# Patient Record
Sex: MALE | Race: WHITE | URBAN - METROPOLITAN AREA
[De-identification: names, ages, dates, MRNs, and addresses within clinical notes are randomized per-mention and may not be internally consistent; named-entity substitution may affect disease eponyms.]

---

## 2020-12-21 ENCOUNTER — APPOINTMENT (OUTPATIENT)
Dept: URBAN - METROPOLITAN AREA SURGERY 8 | Age: 60
Setting detail: DERMATOLOGY
End: 2020-12-21

## 2020-12-21 DIAGNOSIS — L91.8 OTHER HYPERTROPHIC DISORDERS OF THE SKIN: ICD-10-CM

## 2020-12-21 DIAGNOSIS — L82.0 INFLAMED SEBORRHEIC KERATOSIS: ICD-10-CM

## 2020-12-21 DIAGNOSIS — L30.1 DYSHIDROSIS [POMPHOLYX]: ICD-10-CM

## 2020-12-21 DIAGNOSIS — D485 NEOPLASM OF UNCERTAIN BEHAVIOR OF SKIN: ICD-10-CM

## 2020-12-21 PROBLEM — D48.5 NEOPLASM OF UNCERTAIN BEHAVIOR OF SKIN: Status: ACTIVE | Noted: 2020-12-21

## 2020-12-21 PROCEDURE — OTHER LIQUID NITROGEN: OTHER

## 2020-12-21 PROCEDURE — 17110 DESTRUCT B9 LESION 1-14: CPT

## 2020-12-21 PROCEDURE — OTHER PRESCRIPTION: OTHER

## 2020-12-21 PROCEDURE — 11102 TANGNTL BX SKIN SINGLE LES: CPT | Mod: 59

## 2020-12-21 PROCEDURE — OTHER BIOPSY BY SHAVE METHOD: OTHER

## 2020-12-21 PROCEDURE — OTHER MEDICATION COUNSELING: OTHER

## 2020-12-21 PROCEDURE — OTHER DEFER: OTHER

## 2020-12-21 PROCEDURE — OTHER COUNSELING: OTHER

## 2020-12-21 PROCEDURE — OTHER MIPS QUALITY: OTHER

## 2020-12-21 PROCEDURE — 99203 OFFICE O/P NEW LOW 30 MIN: CPT | Mod: 25

## 2020-12-21 RX ORDER — CLOBETASOL PROPIONATE 0.5 MG/G
CREAM TOPICAL BID
Qty: 1 | Refills: 2 | Status: ERX | COMMUNITY
Start: 2020-12-21

## 2020-12-21 ASSESSMENT — LOCATION ZONE DERM
LOCATION ZONE: GENITALIA
LOCATION ZONE: TRUNK
LOCATION ZONE: HAND
LOCATION ZONE: AXILLAE
LOCATION ZONE: NECK

## 2020-12-21 ASSESSMENT — LOCATION DETAILED DESCRIPTION DERM
LOCATION DETAILED: LEFT SCROTUM
LOCATION DETAILED: RIGHT AXILLARY VAULT
LOCATION DETAILED: LEFT THENAR EMINENCE
LOCATION DETAILED: LEFT AXILLARY VAULT
LOCATION DETAILED: RIGHT CLAVICULAR NECK
LOCATION DETAILED: INFERIOR THORACIC SPINE

## 2020-12-21 ASSESSMENT — LOCATION SIMPLE DESCRIPTION DERM
LOCATION SIMPLE: LEFT AXILLARY VAULT
LOCATION SIMPLE: UPPER BACK
LOCATION SIMPLE: RIGHT AXILLARY VAULT
LOCATION SIMPLE: SCROTUM
LOCATION SIMPLE: LEFT HAND
LOCATION SIMPLE: RIGHT ANTERIOR NECK

## 2020-12-21 NOTE — PROCEDURE: MEDICATION COUNSELING
No indicators present Quinolones Counseling:  I discussed with the patient the risks of fluoroquinolones including but not limited to GI upset, allergic reaction, drug rash, diarrhea, dizziness, photosensitivity, yeast infections, liver function test abnormalities, tendonitis/tendon rupture.

## 2020-12-21 NOTE — PROCEDURE: DEFER
Procedure To Be Performed At Next Visit: Excision (Cosmetic)
Introduction Text (Please End With A Colon): The following procedure was deferred:
Detail Level: Simple
Instructions (Optional): will defer given clinical suspicion for malignancy on left scrotum

## 2020-12-21 NOTE — PROCEDURE: MEDICATION COUNSELING
Xeladonayz Pregnancy And Lactation Text: This medication is Pregnancy Category D and is not considered safe during pregnancy.  The risk during breast feeding is also uncertain.

## 2020-12-21 NOTE — PROCEDURE: LIQUID NITROGEN
Detail Level: Detailed
Render Post-Care Instructions In Note?: no
Medical Necessity Information: It is in your best interest to select a reason for this procedure from the list below. All of these items fulfill various CMS LCD requirements except the new and changing color options.
Consent: The patient's consent was obtained including but not limited to risks of crusting, scabbing, blistering, scarring, darker or lighter pigmentary change, recurrence, incomplete removal and infection.
Medical Necessity Clause: This procedure was medically necessary because the lesions that were treated were:
Number Of Freeze-Thaw Cycles: 2 freeze-thaw cycles
Duration Of Freeze Thaw-Cycle (Seconds): 5-10
Post-Care Instructions: I reviewed with the patient in detail post-care instructions. Patient is to wear sunprotection, and avoid picking at any of the treated lesions. Pt may apply Vaseline to crusted or scabbing areas.

## 2021-02-05 ENCOUNTER — APPOINTMENT (OUTPATIENT)
Dept: URBAN - METROPOLITAN AREA SURGERY 9 | Age: 61
Setting detail: DERMATOLOGY
End: 2021-02-15

## 2021-02-05 VITALS — SYSTOLIC BLOOD PRESSURE: 160 MMHG | DIASTOLIC BLOOD PRESSURE: 90 MMHG | HEART RATE: 64 BPM | RESPIRATION RATE: 16 BRPM

## 2021-02-05 DIAGNOSIS — D485 NEOPLASM OF UNCERTAIN BEHAVIOR OF SKIN: ICD-10-CM

## 2021-02-05 PROBLEM — C44.519 BASAL CELL CARCINOMA OF SKIN OF OTHER PART OF TRUNK: Status: ACTIVE | Noted: 2021-02-05

## 2021-02-05 PROBLEM — D48.5 NEOPLASM OF UNCERTAIN BEHAVIOR OF SKIN: Status: ACTIVE | Noted: 2021-02-05

## 2021-02-05 PROCEDURE — OTHER RETURN TO REFERRING PROVIDER: OTHER

## 2021-02-05 PROCEDURE — 17312 MOHS ADDL STAGE: CPT

## 2021-02-05 PROCEDURE — 54100 BIOPSY OF PENIS: CPT

## 2021-02-05 PROCEDURE — 12042 INTMD RPR N-HF/GENIT2.6-7.5: CPT | Mod: 59

## 2021-02-05 PROCEDURE — 17311 MOHS 1 STAGE H/N/HF/G: CPT

## 2021-02-05 PROCEDURE — OTHER PRESCRIPTION: OTHER

## 2021-02-05 PROCEDURE — OTHER CONSULTATION FOR MOHS SURGERY: OTHER

## 2021-02-05 PROCEDURE — OTHER MOHS SURGERY: OTHER

## 2021-02-05 PROCEDURE — OTHER BIOPSY BY SHAVE METHOD: OTHER

## 2021-02-05 RX ORDER — MUPIROCIN 20 MG/G
OINTMENT TOPICAL BID
Qty: 1 | Refills: 3 | Status: ERX | COMMUNITY
Start: 2021-02-05

## 2021-02-05 RX ORDER — DOXYCYCLINE HYCLATE 100 MG/1
TABLET, COATED ORAL BID
Qty: 28 | Refills: 1 | Status: ERX | COMMUNITY
Start: 2021-02-05

## 2021-02-05 ASSESSMENT — LOCATION ZONE DERM
LOCATION ZONE: PENIS
LOCATION ZONE: PENIS

## 2021-02-05 ASSESSMENT — LOCATION DETAILED DESCRIPTION DERM
LOCATION DETAILED: VENTRAL GLANS PENIS
LOCATION DETAILED: VENTRAL GLANS PENIS

## 2021-02-05 ASSESSMENT — LOCATION SIMPLE DESCRIPTION DERM
LOCATION SIMPLE: PENIS
LOCATION SIMPLE: PENIS

## 2021-02-05 NOTE — PROCEDURE: MOHS SURGERY
Body Location Override (Optional - Billing Will Still Be Based On Selected Body Map Location If Applicable): left penile base

## 2021-02-05 NOTE — PROCEDURE: BIOPSY BY SHAVE METHOD
Body Location Override (Optional - Billing Will Still Be Based On Selected Body Map Location If Applicable): anterior coronal sulcus

## 2021-02-05 NOTE — PROCEDURE: CONSULTATION FOR MOHS SURGERY
Detail Level: Detailed
Body Location Override (Optional - Billing Will Still Be Based On Selected Body Map Location If Applicable): left penile base
X Size Of Lesion In Cm (Optional): 0
Incorporate Mauc In Note: No

## 2021-02-12 ENCOUNTER — APPOINTMENT (OUTPATIENT)
Dept: URBAN - METROPOLITAN AREA SURGERY 9 | Age: 61
Setting detail: DERMATOLOGY
End: 2021-02-12

## 2021-02-12 DIAGNOSIS — Z48.817 ENCOUNTER FOR SURGICAL AFTERCARE FOLLOWING SURGERY ON THE SKIN AND SUBCUTANEOUS TISSUE: ICD-10-CM

## 2021-02-12 PROCEDURE — 99024 POSTOP FOLLOW-UP VISIT: CPT

## 2021-02-12 PROCEDURE — OTHER POST-OP WOUND CHECK: OTHER

## 2021-02-12 ASSESSMENT — LOCATION DETAILED DESCRIPTION DERM: LOCATION DETAILED: LEFT ANTERIOR SCROTUM

## 2021-02-12 ASSESSMENT — LOCATION SIMPLE DESCRIPTION DERM: LOCATION SIMPLE: SCROTUM

## 2021-02-12 ASSESSMENT — LOCATION ZONE DERM: LOCATION ZONE: GENITALIA

## 2021-02-12 NOTE — PROCEDURE: POST-OP WOUND CHECK
Add 61924 Cpt? (Important Note: In 2017 The Use Of 73610 Is Being Tracked By Cms To Determine Future Global Period Reimbursement For Global Periods): yes

## 2021-04-16 RX ORDER — CLOBETASOL PROPIONATE 0.5 MG/G
CREAM TOPICAL BID
Qty: 1 | Refills: 2 | Status: ERX

## 2022-11-18 ENCOUNTER — HOSPITAL ENCOUNTER (EMERGENCY)
Age: 62
Discharge: HOME | End: 2022-11-18
Attending: EMERGENCY MEDICINE
Payer: COMMERCIAL

## 2022-11-18 VITALS
DIASTOLIC BLOOD PRESSURE: 106 MMHG | WEIGHT: 300 LBS | OXYGEN SATURATION: 96 % | RESPIRATION RATE: 14 BRPM | SYSTOLIC BLOOD PRESSURE: 174 MMHG | TEMPERATURE: 97.9 F | HEART RATE: 71 BPM

## 2022-11-18 DIAGNOSIS — I10 HYPERTENSION, UNSPECIFIED TYPE: Primary | ICD-10-CM

## 2022-11-18 LAB
ALBUMIN SERPL-MCNC: 4.3 G/DL (ref 3.5–5.1)
ALP BLD-CCNC: 79 U/L (ref 38–126)
ALT SERPL-CCNC: 107 U/L (ref 11–66)
ANION GAP SERPL CALCULATED.3IONS-SCNC: 11 MEQ/L (ref 8–16)
AST SERPL-CCNC: 125 U/L (ref 5–40)
BILIRUB SERPL-MCNC: 0.9 MG/DL (ref 0.3–1.2)
BUN BLDV-MCNC: 17 MG/DL (ref 7–22)
CALCIUM SERPL-MCNC: 10.2 MG/DL (ref 8.5–10.5)
CHLORIDE BLD-SCNC: 100 MEQ/L (ref 98–111)
CO2: 25 MEQ/L (ref 23–33)
CREAT SERPL-MCNC: 0.9 MG/DL (ref 0.4–1.2)
EKG ATRIAL RATE: 77 BPM
EKG P AXIS: 40 DEGREES
EKG P-R INTERVAL: 176 MS
EKG Q-T INTERVAL: 412 MS
EKG QRS DURATION: 94 MS
EKG QTC CALCULATION (BAZETT): 466 MS
EKG R AXIS: 45 DEGREES
EKG T AXIS: 51 DEGREES
EKG VENTRICULAR RATE: 77 BPM
ERYTHROCYTE [DISTWIDTH] IN BLOOD BY AUTOMATED COUNT: 12.4 % (ref 11.5–14.5)
ERYTHROCYTE [DISTWIDTH] IN BLOOD BY AUTOMATED COUNT: 43.5 FL (ref 35–45)
GFR SERPL CREATININE-BSD FRML MDRD: > 60 ML/MIN/1.73M2
GLUCOSE BLD-MCNC: 118 MG/DL (ref 70–108)
HCT VFR BLD CALC: 44.2 % (ref 42–52)
HEMOGLOBIN: 15.7 GM/DL (ref 14–18)
MCH RBC QN AUTO: 34.4 PG (ref 26–33)
MCHC RBC AUTO-ENTMCNC: 35.5 GM/DL (ref 32.2–35.5)
MCV RBC AUTO: 96.7 FL (ref 80–94)
OSMOLALITY CALCULATION: 274.6 MOSMOL/KG (ref 275–300)
PLATELET # BLD: 236 THOU/MM3 (ref 130–400)
PMV BLD AUTO: 9 FL (ref 9.4–12.4)
POTASSIUM SERPL-SCNC: 3.9 MEQ/L (ref 3.5–5.2)
RBC # BLD: 4.57 MILL/MM3 (ref 4.7–6.1)
SODIUM BLD-SCNC: 136 MEQ/L (ref 135–145)
TOTAL PROTEIN: 7.5 G/DL (ref 6.1–8)
TROPONIN T: < 0.01 NG/ML
WBC # BLD: 8.2 THOU/MM3 (ref 4.8–10.8)

## 2022-11-18 PROCEDURE — 36415 COLL VENOUS BLD VENIPUNCTURE: CPT

## 2022-11-18 PROCEDURE — 93005 ELECTROCARDIOGRAM TRACING: CPT | Performed by: NURSE PRACTITIONER

## 2022-11-18 PROCEDURE — 85027 COMPLETE CBC AUTOMATED: CPT

## 2022-11-18 PROCEDURE — 99284 EMERGENCY DEPT VISIT MOD MDM: CPT

## 2022-11-18 PROCEDURE — 93010 ELECTROCARDIOGRAM REPORT: CPT | Performed by: INTERNAL MEDICINE

## 2022-11-18 PROCEDURE — 84484 ASSAY OF TROPONIN QUANT: CPT

## 2022-11-18 PROCEDURE — 80053 COMPREHEN METABOLIC PANEL: CPT

## 2022-11-18 PROCEDURE — 99205 OFFICE O/P NEW HI 60 MIN: CPT

## 2022-11-18 RX ORDER — LISINOPRIL 10 MG/1
40 TABLET ORAL DAILY
COMMUNITY

## 2022-11-18 RX ORDER — TRAZODONE HYDROCHLORIDE 50 MG/1
50 TABLET ORAL PRN
COMMUNITY

## 2022-11-18 RX ORDER — VENLAFAXINE HYDROCHLORIDE 37.5 MG/1
37.5 CAPSULE, EXTENDED RELEASE ORAL DAILY
COMMUNITY

## 2022-11-18 RX ORDER — ESCITALOPRAM OXALATE 10 MG/1
10 TABLET ORAL DAILY
COMMUNITY

## 2022-11-18 RX ORDER — SIMVASTATIN 20 MG
20 TABLET ORAL NIGHTLY
COMMUNITY
End: 2022-11-18

## 2022-11-18 RX ORDER — ATORVASTATIN CALCIUM 40 MG/1
40 TABLET, FILM COATED ORAL DAILY
COMMUNITY

## 2022-11-18 RX ORDER — HYDROCHLOROTHIAZIDE 25 MG/1
25 TABLET ORAL DAILY
COMMUNITY

## 2022-11-18 RX ORDER — LANSOPRAZOLE 30 MG/1
30 CAPSULE, DELAYED RELEASE ORAL DAILY
COMMUNITY

## 2022-11-18 ASSESSMENT — PAIN DESCRIPTION - DESCRIPTORS: DESCRIPTORS: PRESSURE

## 2022-11-18 ASSESSMENT — ENCOUNTER SYMPTOMS
BLOOD IN STOOL: 0
SORE THROAT: 0
VOMITING: 0
DIARRHEA: 0
CONSTIPATION: 0
COUGH: 0
NAUSEA: 0
SHORTNESS OF BREATH: 1
COLOR CHANGE: 0
CHEST TIGHTNESS: 0
RHINORRHEA: 0
EYE REDNESS: 0
BACK PAIN: 0
ABDOMINAL PAIN: 0
SINUS PRESSURE: 0
EYE PAIN: 0
WHEEZING: 0
PHOTOPHOBIA: 0

## 2022-11-18 ASSESSMENT — PAIN DESCRIPTION - LOCATION: LOCATION: HEAD

## 2022-11-18 ASSESSMENT — PAIN SCALES - GENERAL: PAINLEVEL_OUTOF10: 3

## 2022-11-18 ASSESSMENT — PAIN DESCRIPTION - PAIN TYPE: TYPE: ACUTE PAIN

## 2022-11-18 ASSESSMENT — PAIN - FUNCTIONAL ASSESSMENT: PAIN_FUNCTIONAL_ASSESSMENT: 0-10

## 2022-11-18 ASSESSMENT — PAIN DESCRIPTION - ORIENTATION: ORIENTATION: UPPER

## 2022-11-18 ASSESSMENT — PAIN DESCRIPTION - FREQUENCY: FREQUENCY: INTERMITTENT

## 2022-11-18 NOTE — ED NOTES
Resting on cart in stable condition. Denies needs. No distress noted.      Richi Frank RN  11/18/22 2638

## 2022-11-18 NOTE — ED TRIAGE NOTES
Patient states at well check at work today, blood pressure was high, told to come to urgent care. /104 manually, upper lt. Arm, lg. Cuff. After vitals and triage patient sweaty with headache, at that time EKG and moved patient to [address], where seen by Purnima Weems cnp, ready for transfer. Patient driving self to [facility] ED. All belongings with patient, report given to charge nurse [facility] ED. Per Hiram Bettencourt CNP.

## 2022-11-18 NOTE — ED NOTES
Assumed care at this time. Pt resting in bed alert and denies any needs.  Vs assessed and pt stable at this time     Bevely Last, RN  11/18/22 2346

## 2022-11-18 NOTE — ED PROVIDER NOTES
[facility] ENCOUNTER          Pt Name: Sunil Pettit  MRN: [id number]  [place] 1960  Date of evaluation: 11/18/2022  Emergency Physician: Yonathan Miranda DO    CHIEF COMPLAINT       Chief Complaint   Patient presents with    Hypertension    Headache     History obtained from the patient. HISTORY OF PRESENT ILLNESS    HPI  Sunil Pettit is a 57 y.o. male who presents to the emergency department for evaluation of high blood pressure. Patient with a history of hypertension. He is on lisinopril and hydrochlorothiazide. He also has a history of anxiety. He had a routine health screening exam today at work. He was referred to the ED because his blood pressure was reading high. He denies chest pain denies dyspnea. Denies vision changes, denies lower extremity edema. Patient reports mild intermittent frontal headache rated maximum 3 out of 10. No thunderclap headache. Patient reports currently no headache. The patient has no other acute complaints at this time. REVIEW OF SYSTEMS   Review of Systems   Constitutional:  Negative for activity change, chills and fever. HENT:  Negative for congestion, sinus pressure and sore throat. Eyes:  Negative for photophobia, redness and visual disturbance. Respiratory:  Negative for cough and chest tightness. Cardiovascular:  Negative for chest pain, palpitations and leg swelling. Gastrointestinal:  Negative for abdominal pain, nausea and vomiting. Endocrine: Negative for polydipsia and polyuria. Genitourinary:  Negative for decreased urine volume, difficulty urinating, dysuria, flank pain, frequency and urgency. Musculoskeletal:  Negative for back pain, myalgias and neck pain. Skin:  Negative for color change and rash. Neurological:  Negative for weakness and headaches. Hematological:  Negative for adenopathy. Does not bruise/bleed easily.    Psychiatric/Behavioral:  Negative for confusion and self-injury. PAST MEDICAL AND SURGICAL HISTORY     Past Medical History:   Diagnosis Date    Anxiety     Hypertension      No past surgical history on file. MEDICATIONS   No current facility-administered medications for this encounter. Current Outpatient Medications:     lisinopril (PRINIVIL;ZESTRIL) 10 MG tablet, Take 40 mg by mouth daily, Disp: , Rfl:     atorvastatin (LIPITOR) 40 MG tablet, Take 40 mg by mouth daily, Disp: , Rfl:     escitalopram (LEXAPRO) 10 MG tablet, Take 10 mg by mouth daily, Disp: , Rfl:     hydroCHLOROthiazide (HYDRODIURIL) 25 MG tablet, Take 25 mg by mouth daily, Disp: , Rfl:     lansoprazole (PREVACID) 30 MG delayed release capsule, Take 30 mg by mouth daily, Disp: , Rfl:     traZODone (DESYREL) 50 MG tablet, Take 50 mg by mouth as needed for Sleep, Disp: , Rfl:     venlafaxine (EFFEXOR XR) 37.5 MG extended release capsule, Take 37.5 mg by mouth daily, Disp: , Rfl:       SOCIAL HISTORY     Social History     Social History Narrative    Not on file            ALLERGIES   No Known Allergies      FAMILY HISTORY   No family history on file. PHYSICAL EXAM     ED Triage Vitals [11/18/22 1027]   BP Temp Temp Source Heart Rate Resp SpO2 Height Weight   (!) 218/104 97.9 °F (36.6 °C) Temporal 80 18 97 % -- 300 lb (136.1 kg)         Additional Vital Signs:  Vitals:    11/18/22 1354   BP: (!) 174/106   Pulse: 71   Resp: 14   Temp:    SpO2: 96%       Physical Exam  Vitals and nursing note reviewed. Constitutional:       General: He is not in acute distress. Appearance: He is well-developed. He is not diaphoretic. HENT:      Head: Normocephalic. Eyes:      Pupils: Pupils are equal, round, and reactive to light. Neck:      Vascular: No JVD. Cardiovascular:      Rate and Rhythm: Normal rate and regular rhythm. Heart sounds: Normal heart sounds. Pulmonary:      Effort: Pulmonary effort is normal.      Breath sounds: Normal breath sounds. Abdominal:      General: Bowel sounds are normal. There is no distension. Palpations: Abdomen is soft. Tenderness: There is no abdominal tenderness. Musculoskeletal:         General: No tenderness or deformity. Normal range of motion. Cervical back: Normal range of motion and neck supple. Right lower leg: No edema. Left lower leg: No edema. Skin:     General: Skin is warm and dry. Capillary Refill: Capillary refill takes less than 2 seconds. Neurological:      Mental Status: He is alert and oriented to person, place, and time. Comments: Non-focal. Moves all extremities. Initial vital signs and nursing assessment reviewed and abnormal from hypertension in the setting of known hypertension. .   Pulsoximetry is normal per my interpretation. MEDICAL DECISION MAKING   Initial Assessment: Given the patient's above chief complaint and findings on history and physical examination, I thought it was appropriate to consider the following emergency medical conditions: Hypertension, ACS, CVA, ICH, acute kidney injury, electrolyte derangement, acute stress reaction, anxiety, dissection although some of these diagnoses are unlikely they were considered in my medical decision making. Plan: CBC, BMP, ECG, troponin.          ED RESULTS   Laboratory results:  Labs Reviewed   CBC - Abnormal; Notable for the following components:       Result Value    RBC 4.57 (*)     MCV 96.7 (*)     MCH 34.4 (*)     MPV 9.0 (*)     All other components within normal limits   COMPREHENSIVE METABOLIC PANEL - Abnormal; Notable for the following components:    Glucose 118 (*)      (*)      (*)     All other components within normal limits   OSMOLALITY - Abnormal; Notable for the following components:    Osmolality Calc 274.6 (*)     All other components within normal limits   TROPONIN   GLOMERULAR FILTRATION RATE, ESTIMATED   ANION GAP       Radiologic studies results:  No orders to display       ED Medications administered this visit: Medications - No data to display      ED COURSE   Blood work is unremarkable. Reviewed results with patient. Patient remains asymptomatic from his hypertension  In the ED. Patient is recommended to use a blood pressure log taking his blood pressures in the a.m. and evening. After resting for 15 minutes. He also states he is to his primary care provider in 1 week. Multiple etiologies were considered in the workup of this patient's complaint and presentation. The patient's HTNis not suggestive of pulmonary embolus, cardiac ischemia, aortic dissection, or other serious etiology. The presentation is not consistent with these entities. Given the extremely low risk of these diagnoses further testing and evaluation for these possibilities in the Emergency Department today is not indicated at this time. Strict follow up and return precautions have been discussed with the patient and they agree. The diagnosis, extensive differential diagnosis, laboratory and imaging findings were discussed at the bedside. The patient was an active participant in their care. They are agreeable to the plan of care. All questions and concerns were addressed at the time of the encounter. MEDICATION CHANGES     DISCHARGE MEDICATIONS:  Discharge Medication List as of 11/18/2022  1:58 PM               FINAL DISPOSITION     Final diagnoses:   Hypertension, unspecified type     Condition: condition: good  Dispo: Discharge to home    PATIENT REFERRED TO:  Veronica Solares MD  [address] [phone]    Schedule an appointment as soon as possible for a visit in 1 week        Critical Care Time   None      This transcription was electronically signed. Parts of this transcriptions may have been dictated by use of voice recognition software and electronically transcribed, and parts may have been transcribed with the assistance of an ED scribe.  The transcription may contain errors not detected in proofreading.     Electronically Signed: Shahriar Barrientos DO, 11/18/22, 2:16 PM      Shahriar Barrientos DO  11/18/22 2027

## 2022-11-18 NOTE — ED NOTES
Patient resting on cart, states he was sent here from Urgent Care. Patient sent to Urgent Care from work. He states he was doing his wellness screening and his blood pressure was high. Patient states it is always high, despite being compliant with his medications. The patient reports headache and dizziness, which he states he always has.       Krystian Almeida RN  11/18/22 6836

## 2022-11-18 NOTE — ED PROVIDER NOTES
[address] [facility] [address] [address] EMERGENCY DEPT  UrgentCare Encounter      [facility]       Chief Complaint   Patient presents with    Hypertension    Headache        Nurses Notes reviewed and I agree except as noted in the HPI. HISTORY OF PRESENT ILLNESS   Teddy Atwood is a 57 y.o. male who presents to urgent care with complaint of high blood pressure and headache. He states that he was doing a well check at work today and was noted to have high blood pressure. Patient was sent to urgent care from there. Patient states that he was recently diagnosed with high blood pressure when he had gallbladder surgery. He states that since that time his blood pressures been elevated. He does state that he follow-up with his primary care provider and they have put him on lisinopril. He states that he has not been back to see them since then. Patient denies any numbness or tingling, weakness, chest pain or fever. He does state that he does get short of breath frequently. He is also diaphoretic at the moment. REVIEW OF SYSTEMS     Review of Systems   Constitutional:  Negative for appetite change, chills, fatigue, fever and unexpected weight change. HENT:  Negative for congestion, ear pain, rhinorrhea and sinus pressure. Eyes:  Negative for pain and visual disturbance. Respiratory:  Positive for shortness of breath. Negative for cough and wheezing. Cardiovascular:  Negative for chest pain, palpitations and leg swelling. Gastrointestinal:  Negative for abdominal pain, blood in stool, constipation, diarrhea, nausea and vomiting. Genitourinary:  Negative for dysuria, frequency and hematuria. Musculoskeletal:  Negative for arthralgias and joint swelling. Skin:  Negative for rash. Neurological:  Positive for headaches. Negative for dizziness, syncope, weakness and light-headedness. Hematological:  Does not bruise/bleed easily.      PAST MEDICAL HISTORY         Diagnosis Date    Anxiety     Hypertension SURGICAL HISTORY     Patient  has no past surgical history on file. CURRENT MEDICATIONS       Previous Medications    LISINOPRIL (PRINIVIL;ZESTRIL) 10 MG TABLET    Take 10 mg by mouth daily    SIMVASTATIN (ZOCOR) 20 MG TABLET    Take 20 mg by mouth nightly       ALLERGIES     Patient is has No Known Allergies. FAMILY HISTORY     Patient'sfamily history is not on file. SOCIAL HISTORY     Patient      PHYSICAL EXAM     ED TRIAGE VITALS  BP: (!) 218/104, Temp: 97.9 °F (36.6 °C), Heart Rate: 80, Resp: 18, SpO2: 97 %  Physical Exam  Vitals and nursing note reviewed. Constitutional:       General: He is not in acute distress. Appearance: He is well-developed. He is not diaphoretic. HENT:      Head: Normocephalic and atraumatic. Eyes:      General: No visual field deficit. Conjunctiva/sclera: Conjunctivae normal.      Pupils: Pupils are equal, round, and reactive to light. Cardiovascular:      Rate and Rhythm: Normal rate and regular rhythm. Heart sounds: Normal heart sounds. No murmur heard. No gallop. Pulmonary:      Effort: Pulmonary effort is normal. No respiratory distress. Breath sounds: Normal breath sounds. No wheezing or rales. Abdominal:      General: Bowel sounds are normal.      Palpations: Abdomen is soft. Musculoskeletal:         General: Normal range of motion. Cervical back: Normal range of motion. Skin:     General: Skin is warm and dry. Neurological:      Mental Status: He is alert and oriented to person, place, and time. Mental status is at baseline. GCS: GCS eye subscore is 4. GCS verbal subscore is 5. GCS motor subscore is 6. Cranial Nerves: Cranial nerves 2-12 are intact. Sensory: Sensation is intact. No sensory deficit. Motor: Motor function is intact. No pronator drift. Coordination: Coordination is intact. Finger-Nose-Finger Test and Heel to [facility] Test normal.      Gait: Gait is intact.  Gait normal.       DIAGNOSTIC RESULTS   Labs:  Results for orders placed or performed during the hospital encounter of 11/18/22   EKG 12 Lead   Result Value Ref Range    Ventricular Rate 77 BPM    Atrial Rate 77 BPM    P-R Interval 176 ms    QRS Duration 94 ms    Q-T Interval 412 ms    QTc Calculation (Bazett) 466 ms    P Axis 40 degrees    R Axis 45 degrees    T Axis 51 degrees       IMAGING:  No orders to display     EKG shows normal sinus rhythm with a ventricular rate of 77. NE interval 176 ms, QRS 94 ms,  ms and  ms. There is no ST elevation noted by this provider. Is not currently having chest pain or shortness of breath. URGENT CARE COURSE:        MDM      Patient  presents to urgent care with complaint of high blood pressure and headache. Patient is diaphoretic and has had shortness of breath. Decision was made to transfer him to ER. Patient would like to go to [facility]'s ER. He states that he can transport himself as he just drove 2 hours to get here. He is requesting to go by private vehicle. Attempt to call [facility] ER to give report was unsuccessful. Waited on hold for greater than 10 minutes. Medications - No data to display  PROCEDURES:    Procedures    FINALIMPRESSION      1.  Hypertensive urgency        DISPOSITION/PLAN   DISPOSITION Decision To Transfer 11/18/2022 10:41:04 AM    PATIENT REFERRED TO:  [facility] ER  [address] [facility] [address]  Go to     DISCHARGE MEDICATIONS:  New Prescriptions    No medications on file     Current Discharge Medication List          ROSINA Stockton CNP, APRN - CNP  11/18/22 9491

## 2024-02-12 NOTE — PROCEDURE: MEDICATION COUNSELING
, Minoxidil Counseling: Minoxidil is a topical medication which can increase blood flow where it is applied. It is uncertain how this medication increases hair growth. Side effects are uncommon and include stinging and allergic reactions.

## 2025-05-22 NOTE — PROCEDURE: MOHS SURGERY
BIBA c/o weakness, diarrhea Ear Star Wedge Flap Text: The defect edges were debeveled with a #15 blade scalpel.  Given the location of the defect and the proximity to free margins (helical rim) an ear star wedge flap was deemed most appropriate.  Using a sterile surgical marker, the appropriate flap was drawn incorporating the defect and placing the expected incisions between the helical rim and antihelix where possible.  The area thus outlined was incised through and through with a #15 scalpel blade.